# Patient Record
Sex: MALE | Race: WHITE | Employment: FULL TIME | URBAN - NONMETROPOLITAN AREA
[De-identification: names, ages, dates, MRNs, and addresses within clinical notes are randomized per-mention and may not be internally consistent; named-entity substitution may affect disease eponyms.]

---

## 2020-11-13 ENCOUNTER — TELEPHONE (OUTPATIENT)
Dept: FAMILY MEDICINE CLINIC | Age: 30
End: 2020-11-13

## 2020-11-13 NOTE — TELEPHONE ENCOUNTER
He will have to be registered as a new patient. I have no problem seeing him but he has be to a patient.

## 2020-11-13 NOTE — TELEPHONE ENCOUNTER
Pt called asking that we get a patient in via VV or whatever we can. She is COVID positive, and now he is having symptoms. She said that she is at work and not with him, but he is having loss of taste and smell, horrible headaches, fatigue, and no energy as well as chills and a low fever. She said Mike Alberts advised her to call and we could work something out for him but I couldn't get anything today due to the short day. I called the front and they recommended he go to [facility] walk in tomorrow, but she said he may not have the energy to get out of bed and go. She is asking for a call back on what other options she has and to get him some meds called in.

## 2020-11-16 ENCOUNTER — VIRTUAL VISIT (OUTPATIENT)
Dept: FAMILY MEDICINE CLINIC | Age: 30
End: 2020-11-16
Payer: MEDICAID

## 2020-11-16 PROCEDURE — 96160 PT-FOCUSED HLTH RISK ASSMT: CPT | Performed by: NURSE PRACTITIONER

## 2020-11-16 PROCEDURE — 99203 OFFICE O/P NEW LOW 30 MIN: CPT | Performed by: NURSE PRACTITIONER

## 2020-11-16 RX ORDER — OMEPRAZOLE 40 MG/1
40 CAPSULE, DELAYED RELEASE ORAL
Qty: 90 CAPSULE | Refills: 1 | Status: SHIPPED | OUTPATIENT
Start: 2020-11-16

## 2020-11-16 RX ORDER — ONDANSETRON 4 MG/1
4 TABLET, ORALLY DISINTEGRATING ORAL EVERY 8 HOURS PRN
Qty: 20 TABLET | Refills: 0 | Status: SHIPPED | OUTPATIENT
Start: 2020-11-16

## 2020-11-16 NOTE — PROGRESS NOTES
medications on file prior to visit. No current facility-administered medications on file prior to visit. Review of Systems   Constitutional: Positive for fatigue. Negative for activity change, appetite change, chills and fever. HENT: Negative for congestion, ear pain, nosebleeds, sore throat and trouble swallowing. Eyes: Negative for pain and redness. Respiratory: Negative for cough, chest tightness and shortness of breath. Cardiovascular: Negative for chest pain, palpitations and leg swelling. Gastrointestinal: Positive for diarrhea, nausea and vomiting. Abdominal pain: cramping. Genitourinary: Negative for difficulty urinating, dysuria and flank pain. Musculoskeletal: Positive for myalgias. Negative for arthralgias, back pain and gait problem. Skin: Negative for color change, pallor, rash and wound. Allergic/Immunologic: Negative for environmental allergies and food allergies. Neurological: Negative for dizziness, numbness and headaches. Hematological: Negative for adenopathy. Does not bruise/bleed easily. Psychiatric/Behavioral: Negative for agitation and sleep disturbance. The patient is not nervous/anxious. OBJECTIVE:  There were no vitals taken for this visit. Physical Exam  Due to the current efforts to prevent transmission of COVID-19 and also the need to preserve PPE for other caregivers, a face-to-face encounter with the patient was not performed. That being said, all relevant records and diagnostic tests were reviewed, including laboratory results and imaging. Please reference any relevant documentation elsewhere. Care will be coordinated with the primary service. No results found for requested labs within last 30 days. No results found for: LABA1C, LABMICR, LDLCALC    No results found for: WBC, NEUTROABS, HGB, HCT, MCV, PLT  No results found for: TSH     ASSESSMENT/PLAN:     Sofya Long was seen today for nausea & vomiting and diarrhea.     Diagnoses and all orders for this visit:    Nausea vomiting and diarrhea  -     ondansetron (ZOFRAN-ODT) 4 MG disintegrating tablet; Take 1 tablet by mouth every 8 hours as needed for Nausea or Vomiting  -     omeprazole (PRILOSEC) 40 MG delayed release capsule; Take 1 capsule by mouth every morning (before breakfast)    Heart burn  -     ondansetron (ZOFRAN-ODT) 4 MG disintegrating tablet; Take 1 tablet by mouth every 8 hours as needed for Nausea or Vomiting  -     omeprazole (PRILOSEC) 40 MG delayed release capsule; Take 1 capsule by mouth every morning (before breakfast)    Encounter to establish care with new doctor    Depression screening  -     [address] [place] [address] [facility]       Patient tolerating all prescribed medications without any adverse side effects. Continues with current plan of care in treating diagnosis. Continue to stay well hydrated throughout the day, limit caffeine, regular exercise discussed with the patient. Heathy diet discussed with the patient. Video Visit 15 minutes spent with the patient per him scheduling. Controlled Substances Monitoring:     No flowsheet data found. PATIENT COUNSELING     Counseling was provided today regarding the following topics: Healthy eating habits, Regular exercise, substance abuse and healthy sleep habits. Discussed use, benefit, and side effects of prescribed medications. Barriers to medication compliance addressed. All patient questions answered. Patient voiced understanding. There are no discontinued medications. No follow-ups on file. SOFY Zuleta - CNP     Education was provided for discussed topics. Call the office with worsening complaints or any side effects to any medications. If an emergency please call 911. Please note: This chart was generated using Dragon dictation software. Although every effort was made to ensure the accuracy of this automated transcription, some errors in transcription may have occurred.

## 2020-11-21 ASSESSMENT — ENCOUNTER SYMPTOMS
SORE THROAT: 0
EYE REDNESS: 0
CHEST TIGHTNESS: 0
BACK PAIN: 0
TROUBLE SWALLOWING: 0
VOMITING: 1
COLOR CHANGE: 0
DIARRHEA: 1
EYE PAIN: 0
COUGH: 0
SHORTNESS OF BREATH: 0
NAUSEA: 1

## 2020-11-21 ASSESSMENT — PATIENT HEALTH QUESTIONNAIRE - PHQ9
SUM OF ALL RESPONSES TO PHQ QUESTIONS 1-9: 0
1. LITTLE INTEREST OR PLEASURE IN DOING THINGS: 0
2. FEELING DOWN, DEPRESSED OR HOPELESS: 0
SUM OF ALL RESPONSES TO PHQ QUESTIONS 1-9: 0
SUM OF ALL RESPONSES TO PHQ9 QUESTIONS 1 & 2: 0
SUM OF ALL RESPONSES TO PHQ QUESTIONS 1-9: 0

## 2025-03-17 ENCOUNTER — HOSPITAL ENCOUNTER (OUTPATIENT)
Facility: HOSPITAL | Age: 35
Discharge: HOME | End: 2025-03-17
Payer: MEDICAID

## 2025-03-17 ENCOUNTER — HOSPITAL ENCOUNTER (OUTPATIENT)
Dept: CT IMAGING | Facility: HOSPITAL | Age: 35
Discharge: HOME | End: 2025-03-17
Payer: MEDICAID

## 2025-03-17 ENCOUNTER — OFFICE VISIT (OUTPATIENT)
Age: 35
End: 2025-03-17
Payer: MEDICAID

## 2025-03-17 VITALS
WEIGHT: 198.6 LBS | DIASTOLIC BLOOD PRESSURE: 76 MMHG | TEMPERATURE: 97.6 F | SYSTOLIC BLOOD PRESSURE: 120 MMHG | BODY MASS INDEX: 25.49 KG/M2 | HEART RATE: 87 BPM | HEIGHT: 74 IN | RESPIRATION RATE: 16 BRPM | OXYGEN SATURATION: 98 %

## 2025-03-17 DIAGNOSIS — R19.7 NAUSEA VOMITING AND DIARRHEA: ICD-10-CM

## 2025-03-17 DIAGNOSIS — Z13.220 SCREENING FOR CHOLESTEROL LEVEL: ICD-10-CM

## 2025-03-17 DIAGNOSIS — R73.9 HYPERGLYCEMIA: ICD-10-CM

## 2025-03-17 DIAGNOSIS — R12 HEART BURN: ICD-10-CM

## 2025-03-17 DIAGNOSIS — R53.83 OTHER FATIGUE: ICD-10-CM

## 2025-03-17 DIAGNOSIS — R10.12 ABDOMINAL PAIN, ACUTE, LEFT UPPER QUADRANT: Primary | ICD-10-CM

## 2025-03-17 DIAGNOSIS — R10.12 ABDOMINAL PAIN, ACUTE, LEFT UPPER QUADRANT: ICD-10-CM

## 2025-03-17 DIAGNOSIS — R11.2 NAUSEA VOMITING AND DIARRHEA: ICD-10-CM

## 2025-03-17 LAB
25(OH)D3 SERPL-MCNC: 28.2 NG/ML (ref 32–100)
ALBUMIN SERPL-MCNC: 4.2 G/DL (ref 3.4–4.8)
ALBUMIN/GLOB SERPL: 1.8 {RATIO} (ref 0.8–2)
ALP SERPL-CCNC: 110 U/L (ref 25–100)
ALT SERPL-CCNC: 15 U/L (ref 4–36)
ANION GAP SERPL CALCULATED.3IONS-SCNC: 10 MMOL/L (ref 3–16)
AST SERPL-CCNC: 17 U/L (ref 8–33)
BASOPHILS # BLD: 0 K/UL (ref 0–0.1)
BASOPHILS NFR BLD: 0.6 %
BILIRUB SERPL-MCNC: 0.4 MG/DL (ref 0.3–1.2)
BILIRUBIN, POC: NEGATIVE
BLOOD URINE, POC: NEGATIVE
BUN SERPL-MCNC: 13 MG/DL (ref 6–20)
CALCIUM SERPL-MCNC: 9.3 MG/DL (ref 8.3–10.6)
CHLORIDE SERPL-SCNC: 106 MMOL/L (ref 98–107)
CHOLEST SERPL-MCNC: 155 MG/DL (ref 0–200)
CLARITY, POC: CLEAR
CO2 SERPL-SCNC: 27 MMOL/L (ref 20–30)
COLOR, POC: YELLOW
CREAT SERPL-MCNC: 0.7 MG/DL (ref 0.4–1.2)
EOSINOPHIL # BLD: 0.1 K/UL (ref 0–0.4)
EOSINOPHIL NFR BLD: 2.1 %
ERYTHROCYTE [DISTWIDTH] IN BLOOD BY AUTOMATED COUNT: 13 % (ref 11–16)
GFR SERPLBLD CREATININE-BSD FMLA CKD-EPI: >90 ML/MIN/{1.73_M2}
GLOBULIN SER CALC-MCNC: 2.4 G/DL
GLUCOSE SERPL-MCNC: 90 MG/DL (ref 74–106)
GLUCOSE URINE, POC: NEGATIVE MG/DL
HBA1C MFR BLD: 5.3 %
HCT VFR BLD AUTO: 41.7 % (ref 40–54)
HDLC SERPL-MCNC: 45 MG/DL (ref 40–60)
HGB BLD-MCNC: 13.2 G/DL (ref 13–18)
IMM GRANULOCYTES # BLD: 0 K/UL
IMM GRANULOCYTES NFR BLD: 0.4 % (ref 0–5)
KETONES, POC: NEGATIVE MG/DL
LDLC SERPL CALC-MCNC: 99 MG/DL
LEUKOCYTE EST, POC: NEGATIVE
LIPASE SERPL-CCNC: 23 U/L (ref 5.6–51.3)
LYMPHOCYTES # BLD: 0.9 K/UL (ref 1.5–4)
LYMPHOCYTES NFR BLD: 16.3 %
MCH RBC QN AUTO: 28.2 PG (ref 27–32)
MCHC RBC AUTO-ENTMCNC: 31.7 G/DL (ref 31–35)
MCV RBC AUTO: 89.1 FL (ref 80–100)
MONOCYTES # BLD: 0.4 K/UL (ref 0.2–0.8)
MONOCYTES NFR BLD: 6.6 %
NEUTROPHILS # BLD: 4 K/UL (ref 2–7.5)
NEUTS SEG NFR BLD: 74 %
NITRITE, POC: NEGATIVE
PH, POC: 7
PLATELET # BLD AUTO: 248 K/UL (ref 150–400)
PMV BLD AUTO: 10.8 FL (ref 6–10)
POTASSIUM SERPL-SCNC: 4.2 MMOL/L (ref 3.4–5.1)
PROT SERPL-MCNC: 6.6 G/DL (ref 6.4–8.3)
PROTEIN, POC: NEGATIVE MG/DL
RBC # BLD AUTO: 4.68 M/UL (ref 4.5–6)
SODIUM SERPL-SCNC: 143 MMOL/L (ref 136–145)
SPECIFIC GRAVITY, POC: 1.01
TRIGL SERPL-MCNC: 56 MG/DL (ref 0–249)
UROBILINOGEN, POC: 0.2 MG/DL
VIT B12 SERPL-MCNC: 439 PG/ML (ref 211–911)
VLDLC SERPL CALC-MCNC: 11 MG/DL
WBC # BLD AUTO: 5.3 K/UL (ref 4–11)

## 2025-03-17 PROCEDURE — 82306 VITAMIN D 25 HYDROXY: CPT

## 2025-03-17 PROCEDURE — 81002 URINALYSIS NONAUTO W/O SCOPE: CPT | Performed by: NURSE PRACTITIONER

## 2025-03-17 PROCEDURE — 83690 ASSAY OF LIPASE: CPT

## 2025-03-17 PROCEDURE — 83036 HEMOGLOBIN GLYCOSYLATED A1C: CPT

## 2025-03-17 PROCEDURE — 80061 LIPID PANEL: CPT

## 2025-03-17 PROCEDURE — 80053 COMPREHEN METABOLIC PANEL: CPT

## 2025-03-17 PROCEDURE — 84270 ASSAY OF SEX HORMONE GLOBUL: CPT

## 2025-03-17 PROCEDURE — 74176 CT ABD & PELVIS W/O CONTRAST: CPT

## 2025-03-17 PROCEDURE — 82607 VITAMIN B-12: CPT

## 2025-03-17 PROCEDURE — 85025 COMPLETE CBC W/AUTO DIFF WBC: CPT

## 2025-03-17 PROCEDURE — 84403 ASSAY OF TOTAL TESTOSTERONE: CPT

## 2025-03-17 PROCEDURE — 99204 OFFICE O/P NEW MOD 45 MIN: CPT | Performed by: NURSE PRACTITIONER

## 2025-03-17 RX ORDER — OMEPRAZOLE 40 MG/1
40 CAPSULE, DELAYED RELEASE ORAL
Qty: 90 CAPSULE | Refills: 3 | Status: SHIPPED | OUTPATIENT
Start: 2025-03-17

## 2025-03-17 ASSESSMENT — ENCOUNTER SYMPTOMS
EYES NEGATIVE: 1
RESPIRATORY NEGATIVE: 1
ABDOMINAL DISTENTION: 1
ABDOMINAL PAIN: 1

## 2025-03-17 NOTE — PROGRESS NOTES
\"Have you been to the ER, urgent care clinic since your last visit?  Hospitalized since your last visit?\"    NO    “Have you seen or consulted any other health care providers outside our system since your last visit?”    NO             
3/17/2026    Hemoglobin A1C     Standing Status:   Future     Number of Occurrences:   1     Expected Date:   3/17/2025     Expiration Date:   3/17/2026    POCT Urinalysis no Micro        Outpatient Encounter Medications as of 3/17/2025   Medication Sig Dispense Refill    omeprazole (PRILOSEC) 40 MG delayed release capsule Take 1 capsule by mouth Daily with supper 90 capsule 3    [DISCONTINUED] ondansetron (ZOFRAN-ODT) 4 MG disintegrating tablet Take 1 tablet by mouth every 8 hours as needed for Nausea or Vomiting 20 tablet 0    [DISCONTINUED] omeprazole (PRILOSEC) 40 MG delayed release capsule Take 1 capsule by mouth every morning (before breakfast) 90 capsule 1     No facility-administered encounter medications on file as of 3/17/2025.       Return in about 4 weeks (around 4/14/2025), or if symptoms worsen or fail to improve.      PATIENT COUNSELING    Counseling was provided today regardingthe following topics: Healthy eating habits, Regular exercise, substance abuse and healthy sleep habits.    Discussed use, benefit, and side effectsof prescribed medications.  Barriers to medication compliance addressed.  All patient questions answered.  Pt voiced understanding.

## 2025-03-19 LAB
SHBG SERPL-SCNC: 39 NMOL/L (ref 10–60)
TESTOST FREE SERPL-MCNC: 106.8 PG/ML (ref 47–244)
TESTOST SERPL-MCNC: 554 NG/DL (ref 249–836)

## 2025-04-01 ENCOUNTER — RESULTS FOLLOW-UP (OUTPATIENT)
Age: 35
End: 2025-04-01

## 2025-04-01 DIAGNOSIS — K56.1 COLONIC INTUSSUSCEPTION (HCC): Primary | ICD-10-CM

## 2025-04-01 DIAGNOSIS — E55.9 VITAMIN D DEFICIENCY: ICD-10-CM

## 2025-04-01 RX ORDER — VITAMIN B COMPLEX
1000 TABLET ORAL DAILY
Qty: 30 TABLET | Refills: 5 | Status: SHIPPED | OUTPATIENT
Start: 2025-04-01

## 2025-05-01 ENCOUNTER — OFFICE VISIT (OUTPATIENT)
Age: 35
End: 2025-05-01
Payer: MEDICAID

## 2025-05-01 VITALS
DIASTOLIC BLOOD PRESSURE: 70 MMHG | BODY MASS INDEX: 24 KG/M2 | OXYGEN SATURATION: 99 % | TEMPERATURE: 97.8 F | WEIGHT: 187 LBS | SYSTOLIC BLOOD PRESSURE: 118 MMHG | HEIGHT: 74 IN | RESPIRATION RATE: 16 BRPM | HEART RATE: 79 BPM

## 2025-05-01 DIAGNOSIS — R53.83 OTHER FATIGUE: ICD-10-CM

## 2025-05-01 DIAGNOSIS — R12 HEART BURN: Primary | ICD-10-CM

## 2025-05-01 DIAGNOSIS — M54.40 BILATERAL LOW BACK PAIN WITH SCIATICA, SCIATICA LATERALITY UNSPECIFIED, UNSPECIFIED CHRONICITY: ICD-10-CM

## 2025-05-01 PROCEDURE — 99214 OFFICE O/P EST MOD 30 MIN: CPT | Performed by: FAMILY MEDICINE

## 2025-05-01 RX ORDER — METHOCARBAMOL 750 MG/1
750 TABLET, FILM COATED ORAL 4 TIMES DAILY
Qty: 40 TABLET | Refills: 0 | Status: SHIPPED | OUTPATIENT
Start: 2025-05-01 | End: 2025-05-11

## 2025-05-01 SDOH — ECONOMIC STABILITY: INCOME INSECURITY: IN THE LAST 12 MONTHS, WAS THERE A TIME WHEN YOU WERE NOT ABLE TO PAY THE MORTGAGE OR RENT ON TIME?: NO

## 2025-05-01 SDOH — ECONOMIC STABILITY: FOOD INSECURITY: WITHIN THE PAST 12 MONTHS, THE FOOD YOU BOUGHT JUST DIDN'T LAST AND YOU DIDN'T HAVE MONEY TO GET MORE.: NEVER TRUE

## 2025-05-01 SDOH — ECONOMIC STABILITY: TRANSPORTATION INSECURITY
IN THE PAST 12 MONTHS, HAS THE LACK OF TRANSPORTATION KEPT YOU FROM MEDICAL APPOINTMENTS OR FROM GETTING MEDICATIONS?: NO

## 2025-05-01 SDOH — ECONOMIC STABILITY: FOOD INSECURITY: WITHIN THE PAST 12 MONTHS, YOU WORRIED THAT YOUR FOOD WOULD RUN OUT BEFORE YOU GOT MONEY TO BUY MORE.: NEVER TRUE

## 2025-05-01 SDOH — ECONOMIC STABILITY: TRANSPORTATION INSECURITY
IN THE PAST 12 MONTHS, HAS LACK OF TRANSPORTATION KEPT YOU FROM MEETINGS, WORK, OR FROM GETTING THINGS NEEDED FOR DAILY LIVING?: NO

## 2025-05-01 ASSESSMENT — PATIENT HEALTH QUESTIONNAIRE - PHQ9
2. FEELING DOWN, DEPRESSED OR HOPELESS: SEVERAL DAYS
SUM OF ALL RESPONSES TO PHQ QUESTIONS 1-9: 2
SUM OF ALL RESPONSES TO PHQ9 QUESTIONS 1 & 2: 2
SUM OF ALL RESPONSES TO PHQ QUESTIONS 1-9: 2
1. LITTLE INTEREST OR PLEASURE IN DOING THINGS: SEVERAL DAYS
SUM OF ALL RESPONSES TO PHQ QUESTIONS 1-9: 2
2. FEELING DOWN, DEPRESSED OR HOPELESS: SEVERAL DAYS
SUM OF ALL RESPONSES TO PHQ QUESTIONS 1-9: 2
1. LITTLE INTEREST OR PLEASURE IN DOING THINGS: SEVERAL DAYS

## 2025-05-01 ASSESSMENT — ENCOUNTER SYMPTOMS
RESPIRATORY NEGATIVE: 1
ABDOMINAL DISTENTION: 1
EYES NEGATIVE: 1
ABDOMINAL PAIN: 1

## 2025-05-01 NOTE — PROGRESS NOTES
Have you been to the ER, urgent care clinic since your last visit?  Hospitalized since your last visit?   NO    Have you seen or consulted any other health care providers outside our system since your last visit?   NO           
and neck supple.   Skin:     General: Skin is warm and dry.      Capillary Refill: Capillary refill takes less than 2 seconds.   Neurological:      Mental Status: He is alert and oriented to person, place, and time.      Deep Tendon Reflexes: Reflexes are normal and symmetric.   Psychiatric:         Attention and Perception: Attention and perception normal.         Mood and Affect: Affect normal. Mood is anxious.         Speech: Speech normal.         Behavior: Behavior normal. Behavior is cooperative.         Thought Content: Thought content normal.         Cognition and Memory: Cognition and memory normal.         Judgment: Judgment normal.          No results found for requested labs within last 30 days.     Hemoglobin A1C (%)   Date Value   03/17/2025 5.3       Lab Results   Component Value Date/Time    WBC 5.3 03/17/2025 09:32 AM    NEUTROABS 4.0 03/17/2025 09:32 AM    HGB 13.2 03/17/2025 09:32 AM    HCT 41.7 03/17/2025 09:32 AM    MCV 89.1 03/17/2025 09:32 AM     03/17/2025 09:32 AM     No results found for: \"TSH\"    ASSESSMENT/PLAN    Diagnosis Orders   1. Heart burn        2. Other fatigue        3. Bilateral low back pain with sciatica, sciatica laterality unspecified, unspecified chronicity            Orders Placed This Encounter   Medications    methocarbamol (ROBAXIN-750) 750 MG tablet     Sig: Take 1 tablet by mouth 4 times daily for 10 days     Dispense:  40 tablet     Refill:  0        There are no discontinued medications.  Assessment & Plan  1. Back pain  The intermittent nature of the protrusion suggests a potential skin or muscle-related issue. 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  Treatment plan: He has tried lidocaine patches, heat, ice, Voltaren gel, Tylenol, and ibuprofen without relief. Advised to use Aleve and a muscle spasm medication, which can be taken in the evenings or when discomfort intensifies. Encouraged to

## 2025-05-15 RX ORDER — METHOCARBAMOL 750 MG/1
750 TABLET, FILM COATED ORAL 4 TIMES DAILY
Qty: 120 TABLET | Refills: 0 | Status: SHIPPED | OUTPATIENT
Start: 2025-05-15 | End: 2025-06-14

## 2025-05-15 RX ORDER — METHOCARBAMOL 750 MG/1
TABLET, FILM COATED ORAL
Qty: 40 TABLET | Refills: 0 | OUTPATIENT
Start: 2025-05-15

## 2025-05-15 RX ORDER — METHOCARBAMOL 750 MG/1
750 TABLET, FILM COATED ORAL 4 TIMES DAILY
Qty: 40 TABLET | Refills: 0 | Status: SHIPPED | OUTPATIENT
Start: 2025-05-15 | End: 2025-05-15 | Stop reason: SDUPTHER

## 2025-05-15 NOTE — TELEPHONE ENCOUNTER
Refill request received from pharmacy      Next Office Visit Date:  Future Appointments   Date Time Provider Department Center   6/17/2025  8:45 AM [facility] Please review via PDMP        Last Office Visit:    5/1/2025

## 2025-05-15 NOTE — TELEPHONE ENCOUNTER
Refill request received from pharmacy      Next Office Visit Date:  Future Appointments   Date Time Provider Department Center   6/17/2025  8:45 AM [facility] - Please review via PDMP        Last Office Visit:    5/1/2025

## 2025-07-10 ENCOUNTER — OFFICE VISIT (OUTPATIENT)
Age: 35
End: 2025-07-10
Payer: MEDICAID

## 2025-07-10 VITALS
HEIGHT: 74 IN | RESPIRATION RATE: 16 BRPM | DIASTOLIC BLOOD PRESSURE: 74 MMHG | OXYGEN SATURATION: 98 % | TEMPERATURE: 97.7 F | BODY MASS INDEX: 24.2 KG/M2 | HEART RATE: 83 BPM | SYSTOLIC BLOOD PRESSURE: 122 MMHG | WEIGHT: 188.6 LBS

## 2025-07-10 DIAGNOSIS — M54.50 LOW BACK PAIN POTENTIALLY ASSOCIATED WITH RADICULOPATHY: Primary | ICD-10-CM

## 2025-07-10 PROCEDURE — 99214 OFFICE O/P EST MOD 30 MIN: CPT | Performed by: NURSE PRACTITIONER

## 2025-07-10 RX ORDER — KETOROLAC TROMETHAMINE 30 MG/ML
60 INJECTION, SOLUTION INTRAMUSCULAR; INTRAVENOUS ONCE
Status: COMPLETED | OUTPATIENT
Start: 2025-07-10 | End: 2025-07-10

## 2025-07-10 RX ORDER — HYDROCODONE BITARTRATE AND ACETAMINOPHEN 5; 325 MG/1; MG/1
1 TABLET ORAL EVERY 6 HOURS PRN
Qty: 28 TABLET | Refills: 0 | Status: SHIPPED | OUTPATIENT
Start: 2025-07-10 | End: 2025-07-17

## 2025-07-10 RX ORDER — METHYLPREDNISOLONE SODIUM SUCCINATE 125 MG/2ML
125 INJECTION INTRAMUSCULAR; INTRAVENOUS ONCE
Status: COMPLETED | OUTPATIENT
Start: 2025-07-10 | End: 2025-07-10

## 2025-07-10 RX ADMIN — METHYLPREDNISOLONE SODIUM SUCCINATE 125 MG: 125 INJECTION INTRAMUSCULAR; INTRAVENOUS at 10:22

## 2025-07-10 RX ADMIN — KETOROLAC TROMETHAMINE 60 MG: 30 INJECTION, SOLUTION INTRAMUSCULAR; INTRAVENOUS at 10:21

## 2025-07-10 ASSESSMENT — ENCOUNTER SYMPTOMS
ABDOMINAL PAIN: 1
EYES NEGATIVE: 1
BACK PAIN: 1
RESPIRATORY NEGATIVE: 1

## 2025-07-10 NOTE — PROGRESS NOTES
Have you been to the ER, urgent care clinic since your last visit?  Hospitalized since your last visit?   NO    Have you seen or consulted any other health care providers outside our system since your last visit?   NO           Patient tolerated injection well. Patient advised to wait 20 minutes in the office following the injection. No signs/symptoms of reaction noted after 20 minutes.  Administrations This Visit       ketorolac (TORADOL) injection 60 mg       Admin Date  07/10/2025  10:21 Action  Given Dose  60 mg Route  IntraMUSCular Site  Dorsogluteal Right Documented By  Sundar Zapien LPN    NDC: 59072-645-55    : [employer]    Patient Supplied?: No              methylPREDNISolone sodium succ (SOLU-MEDROL) injection 125 mg       Admin Date  07/10/2025  10:22 Action  Given Dose  125 mg Route  IntraMUSCular Site  Dorsogluteal Left Documented By  Sundar Zapien LPN    NDC: 12108-756-29    : [employer] [facility]    Patient Supplied?: No

## 2025-07-10 NOTE — PROGRESS NOTES
SUBJECTIVE:  Turner Mckeon is a 35 y.o. male that presents with   Chief Complaint   Patient presents with    Back Pain     Regular f/u, pt reports that robaxin does not help and would like to discuss changing meds   .    HPI:    History of Present Illness  The patient presents for evaluation of back pain.    He reports persistent back pain, which he describes as constant and worsening throughout the day. The pain occasionally radiates down his legs, particularly when driving. He also experiences tingling sensations in his legs during these episodes. He has been taking methocarbamol for his back pain, but it does not seem to alleviate his symptoms. He has a history of kidney stones and a ganglion cyst on his back.    He has been experiencing stomach pain, which has improved since switching omeprazole from morning to night. However, he still experiences occasional discomfort. He has not yet seen a gastroenterologist for this issue. He has an upcoming appointment with a gastroenterologist in 09/2025.      Low back pain on the right side that at times does go down the right leg.  Pain is consistent all of the time but does worsen as the day goes.        Review of Systems   Constitutional: Negative.    HENT: Negative.     Eyes: Negative.    Respiratory: Negative.     Cardiovascular: Negative.    Gastrointestinal:  Positive for abdominal pain.   Endocrine: Negative.    Genitourinary: Negative.    Musculoskeletal:  Positive for arthralgias, back pain and myalgias.   Skin: Negative.    Neurological: Negative.    Hematological: Negative.    Psychiatric/Behavioral: Negative.     All other systems reviewed and are negative.       OBJECTIVE:  /74   Pulse 83   Temp 97.7 °F (36.5 °C) (Infrared)   Resp 16   Ht 1.88 m (6' 2\")   Wt 85.5 kg (188 lb 9.6 oz)   SpO2 98%   BMI 24.21 kg/m²   Physical Exam  Vitals and nursing note reviewed.   Constitutional:       Appearance: Normal appearance. He is well-developed and normal

## 2025-07-24 DIAGNOSIS — M54.50 LOW BACK PAIN POTENTIALLY ASSOCIATED WITH RADICULOPATHY: ICD-10-CM

## 2025-07-24 RX ORDER — HYDROCODONE BITARTRATE AND ACETAMINOPHEN 5; 325 MG/1; MG/1
1 TABLET ORAL EVERY 6 HOURS PRN
Qty: 28 TABLET | Refills: 0 | Status: SHIPPED | OUTPATIENT
Start: 2025-07-24 | End: 2025-07-31

## 2025-08-06 ENCOUNTER — TELEMEDICINE (OUTPATIENT)
Age: 35
End: 2025-08-06
Payer: MEDICAID

## 2025-08-06 DIAGNOSIS — M54.50 LOW BACK PAIN POTENTIALLY ASSOCIATED WITH RADICULOPATHY: ICD-10-CM

## 2025-08-06 PROCEDURE — 99213 OFFICE O/P EST LOW 20 MIN: CPT | Performed by: NURSE PRACTITIONER

## 2025-08-06 RX ORDER — HYDROCODONE BITARTRATE AND ACETAMINOPHEN 5; 325 MG/1; MG/1
1 TABLET ORAL EVERY 6 HOURS PRN
Qty: 28 TABLET | Refills: 0 | Status: SHIPPED | OUTPATIENT
Start: 2025-08-06 | End: 2025-08-13

## 2025-08-07 ASSESSMENT — ENCOUNTER SYMPTOMS
BACK PAIN: 1
GASTROINTESTINAL NEGATIVE: 1
EYES NEGATIVE: 1
RESPIRATORY NEGATIVE: 1